# Patient Record
Sex: MALE | NOT HISPANIC OR LATINO | URBAN - METROPOLITAN AREA
[De-identification: names, ages, dates, MRNs, and addresses within clinical notes are randomized per-mention and may not be internally consistent; named-entity substitution may affect disease eponyms.]

---

## 2020-06-16 ENCOUNTER — OFFICE VISIT (OUTPATIENT)
Dept: FAMILY MEDICINE CLINIC | Facility: CLINIC | Age: 50
End: 2020-06-16

## 2020-06-16 ENCOUNTER — RESULTS ENCOUNTER (OUTPATIENT)
Dept: FAMILY MEDICINE CLINIC | Facility: CLINIC | Age: 50
End: 2020-06-16

## 2020-06-16 VITALS
TEMPERATURE: 97 F | BODY MASS INDEX: 35.68 KG/M2 | HEIGHT: 74 IN | WEIGHT: 278 LBS | RESPIRATION RATE: 16 BRPM | DIASTOLIC BLOOD PRESSURE: 82 MMHG | HEART RATE: 80 BPM | SYSTOLIC BLOOD PRESSURE: 123 MMHG

## 2020-06-16 DIAGNOSIS — Z12.11 SPECIAL SCREENING FOR MALIGNANT NEOPLASMS, COLON: ICD-10-CM

## 2020-06-16 DIAGNOSIS — M79.672 PAIN IN BOTH FEET: ICD-10-CM

## 2020-06-16 DIAGNOSIS — Z00.00 ANNUAL PHYSICAL EXAM: ICD-10-CM

## 2020-06-16 DIAGNOSIS — M79.671 PAIN IN BOTH FEET: ICD-10-CM

## 2020-06-16 DIAGNOSIS — L98.9 SKIN LESION: ICD-10-CM

## 2020-06-16 DIAGNOSIS — R20.2 PARESTHESIA: Primary | ICD-10-CM

## 2020-06-16 DIAGNOSIS — Z12.12 SCREENING FOR MALIGNANT NEOPLASM OF THE RECTUM: ICD-10-CM

## 2020-06-16 PROCEDURE — 99203 OFFICE O/P NEW LOW 30 MIN: CPT | Performed by: FAMILY MEDICINE

## 2020-06-16 NOTE — PROGRESS NOTES
"Subjective   Jefferson Martínez is a 49 y.o. male.     CC: Establishment of Care for Foot Issue    History of Present Illness     Pt presents today to establish care and to discuss some ongoing issues with feet pain. 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 No back pain or really any other issues.    FH: father with DM.  The following portions of the patient's history were reviewed and updated as appropriate: allergies, current medications, past family history, past medical history, past social history, past surgical history and problem list.    Review of Systems   Constitutional: Negative for activity change, chills, fatigue and fever.   Respiratory: Negative for cough and shortness of breath.    Cardiovascular: Negative for chest pain and palpitations.   Gastrointestinal: Negative for abdominal pain.   Endocrine: Negative for cold intolerance.   Musculoskeletal:        Foot pain   Skin:        Small nonhealing red lesion left thigh x several years   Neurological: Positive for numbness.   Psychiatric/Behavioral: Negative for behavioral problems and dysphoric mood. The patient is not nervous/anxious.        /82   Pulse 80   Temp 97 °F (36.1 °C) (Oral)   Resp 16   Ht 188 cm (74\")   Wt 126 kg (278 lb)   BMI 35.69 kg/m²     Objective   Physical Exam   Constitutional: He appears well-developed and well-nourished.   Neck: Neck supple. No thyromegaly present.   Cardiovascular: Normal rate and regular rhythm.   No murmur heard.  Pulmonary/Chest: Effort normal and breath sounds normal.   Abdominal: Bowel sounds are normal. There is no tenderness.      During the foot exam he had a monofilament test performed.    Neurological Sensory Findings -  Altered sharp/dull right ankle/foot discrimination and altered sharp/dull left ankle/foot discrimination. No right ankle/foot altered " proprioception and no left ankle/foot altered proprioception  Vascular Status -  His right foot exhibits normal foot vasculature  and no edema. His left foot exhibits normal foot vasculature  and no edema.  Skin Integrity  -  His right foot skin is intact.His left foot skin is intact..     Skin:        Psychiatric: He has a normal mood and affect. His behavior is normal.   Nursing note and vitals reviewed.      Assessment/Plan   Jefferson was seen today for est pcp, bilateral feet  pain and referral to dermatology.    Diagnoses and all orders for this visit:    Paresthesia  -     Comprehensive metabolic panel  -     TSH  -     Vitamin B12  -     Folate  -     Ambulatory Referral to Podiatry    Pain in both feet  -     Ambulatory Referral to Podiatry    Skin lesion  -     Ambulatory Referral to Dermatology    Annual physical exam  Comments:  for labs only, don't bill  Orders:  -     Comprehensive metabolic panel  -     Lipid panel  -     CBC and Differential  -     TSH  -     PSA    Special screening for malignant neoplasms, colon  -     Cologuard - Stool, Per Rectum; Future    Screening for malignant neoplasm of the rectum  -     Cologuard - Stool, Per Rectum; Future

## 2020-06-17 LAB
ALBUMIN SERPL-MCNC: 4.6 G/DL (ref 4–5)
ALBUMIN/GLOB SERPL: 1.7 {RATIO} (ref 1.2–2.2)
ALP SERPL-CCNC: 91 IU/L (ref 39–117)
ALT SERPL-CCNC: 20 IU/L (ref 0–44)
AST SERPL-CCNC: 18 IU/L (ref 0–40)
BASOPHILS # BLD AUTO: 0 X10E3/UL (ref 0–0.2)
BASOPHILS NFR BLD AUTO: 0 %
BILIRUB SERPL-MCNC: 1 MG/DL (ref 0–1.2)
BUN SERPL-MCNC: 15 MG/DL (ref 6–24)
BUN/CREAT SERPL: 13 (ref 9–20)
CALCIUM SERPL-MCNC: 9.5 MG/DL (ref 8.7–10.2)
CHLORIDE SERPL-SCNC: 100 MMOL/L (ref 96–106)
CHOLEST SERPL-MCNC: 187 MG/DL (ref 100–199)
CO2 SERPL-SCNC: 22 MMOL/L (ref 20–29)
CREAT SERPL-MCNC: 1.14 MG/DL (ref 0.76–1.27)
EOSINOPHIL # BLD AUTO: 0.1 X10E3/UL (ref 0–0.4)
EOSINOPHIL NFR BLD AUTO: 2 %
ERYTHROCYTE [DISTWIDTH] IN BLOOD BY AUTOMATED COUNT: 12.9 % (ref 11.6–15.4)
FOLATE SERPL-MCNC: >20 NG/ML
GLOBULIN SER CALC-MCNC: 2.7 G/DL (ref 1.5–4.5)
GLUCOSE SERPL-MCNC: 100 MG/DL (ref 65–99)
HCT VFR BLD AUTO: 47.2 % (ref 37.5–51)
HDLC SERPL-MCNC: 43 MG/DL
HGB BLD-MCNC: 15.8 G/DL (ref 13–17.7)
IMM GRANULOCYTES # BLD AUTO: 0 X10E3/UL (ref 0–0.1)
IMM GRANULOCYTES NFR BLD AUTO: 0 %
LDLC SERPL CALC-MCNC: 125 MG/DL (ref 0–99)
LYMPHOCYTES # BLD AUTO: 2.1 X10E3/UL (ref 0.7–3.1)
LYMPHOCYTES NFR BLD AUTO: 22 %
MCH RBC QN AUTO: 29 PG (ref 26.6–33)
MCHC RBC AUTO-ENTMCNC: 33.5 G/DL (ref 31.5–35.7)
MCV RBC AUTO: 87 FL (ref 79–97)
MONOCYTES # BLD AUTO: 0.8 X10E3/UL (ref 0.1–0.9)
MONOCYTES NFR BLD AUTO: 8 %
NEUTROPHILS # BLD AUTO: 6.4 X10E3/UL (ref 1.4–7)
NEUTROPHILS NFR BLD AUTO: 68 %
PLATELET # BLD AUTO: 217 X10E3/UL (ref 150–450)
POTASSIUM SERPL-SCNC: 5.1 MMOL/L (ref 3.5–5.2)
PROT SERPL-MCNC: 7.3 G/DL (ref 6–8.5)
PSA SERPL-MCNC: 3 NG/ML (ref 0–4)
RBC # BLD AUTO: 5.45 X10E6/UL (ref 4.14–5.8)
SODIUM SERPL-SCNC: 140 MMOL/L (ref 134–144)
TRIGL SERPL-MCNC: 96 MG/DL (ref 0–149)
TSH SERPL DL<=0.005 MIU/L-ACNC: 1.83 UIU/ML (ref 0.45–4.5)
VIT B12 SERPL-MCNC: 671 PG/ML (ref 232–1245)
VLDLC SERPL CALC-MCNC: 19 MG/DL (ref 5–40)
WBC # BLD AUTO: 9.5 X10E3/UL (ref 3.4–10.8)

## 2021-04-12 VITALS — BODY MASS INDEX: 35.69 KG/M2 | WEIGHT: 278 LBS

## 2021-04-13 ENCOUNTER — TELEMEDICINE (OUTPATIENT)
Dept: FAMILY MEDICINE CLINIC | Facility: CLINIC | Age: 51
End: 2021-04-13

## 2021-04-13 DIAGNOSIS — U07.1 UPPER RESPIRATORY TRACT INFECTION DUE TO COVID-19 VIRUS: Primary | ICD-10-CM

## 2021-04-13 DIAGNOSIS — J06.9 UPPER RESPIRATORY TRACT INFECTION DUE TO COVID-19 VIRUS: Primary | ICD-10-CM

## 2021-04-13 PROCEDURE — 99213 OFFICE O/P EST LOW 20 MIN: CPT | Performed by: FAMILY MEDICINE

## 2021-04-13 RX ORDER — ASCORBIC ACID 250 MG
250 TABLET ORAL DAILY
COMMUNITY
End: 2021-12-06

## 2021-04-13 RX ORDER — MELOXICAM 15 MG/1
TABLET ORAL
COMMUNITY
Start: 2021-01-29 | End: 2021-12-06

## 2021-04-13 NOTE — PROGRESS NOTES
Subjective   Jefferson Martínez is a 50 y.o. male.     CC: Video Visit for COVID    History of Present Illness     Pt seen on a VV today after being dx with COVID on 4/5. Pt sent this note prior to today's visit:  My wife and I tested positive for Covid last Monday (5 April). We dealt with some moderate symptoms but have been improving steadily the last few days. 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   I'm still dealing with some congestion, post nasal drip and coughing. I think it would be best if I stayed home for a few more days rather than spending my nights coughing in confined spaces.   Work won't have a problem with it but do require that I get your involvement. I don't know if we can do a quick telephone consult or if you can just respond to this message based on my symptoms. I think moving my return to work date to the end of the week (16 April) would be appropriate.   Hopefully this makes sense. Hope all is well, I'll get scheduled for a follow up soon.      The following portions of the patient's history were reviewed and updated as appropriate: allergies, current medications, past family history, past medical history, past social history, past surgical history and problem list.    Review of Systems   Constitutional: Negative for activity change, chills and fever.   HENT: Positive for congestion.    Respiratory: Positive for cough.    Cardiovascular: Negative for chest pain.   Psychiatric/Behavioral: Negative for dysphoric mood.       Objective   Physical Exam  Constitutional:       General: He is not in acute distress.     Appearance: He is well-developed.   Pulmonary:      Effort: Pulmonary effort is normal.   Neurological:      Mental Status: He is alert and oriented to person, place, and time.   Psychiatric:         Behavior: Behavior normal.         Thought Content: Thought content normal.         Assessment/Plan   Diagnoses and all orders for this  visit:    1. Upper respiratory tract infection due to COVID-19 virus (Primary)    Will keep pt home until next Tuesday. Pt improving nicely and will rest/hydrate/discussed ASA for 7-14 days and Vitamin D/C.  Spent  13   minutes with chart and interview and consent for this encounter given by the patient.  You have chosen to receive care through a telehealth visit.  Do you consent to use a video/audio connection for your medical care today? Yes

## 2021-05-17 NOTE — PROGRESS NOTES
Subjective   Jefferson Martínez is a 50 y.o. male.     CC: Video Visit for Management of COVID    History of Present Illness     Pt seen today on a VV after being dx with COVID on 4/5/21. Pt was seen on VV 4/13/21 by me and was improving at the time and was asked to rest, use ASA and vitamin D+C, and f/u if issues. Pt got over that well w/o issues.  Now, pt is 2 days into mild facial p/p with ear pressure and yellow nasal d/c. No f/c/cough/dyspnea/change to smell/taste.    The following portions of the patient's history were reviewed and updated as appropriate: allergies, current medications, past family history, past medical history, past social history, past surgical history and problem list.    Review of Systems   Constitutional: Negative for activity change, chills and fever.   HENT: Positive for congestion, ear pain and sinus pressure.    Respiratory: Negative for cough.    Cardiovascular: Negative for chest pain.   Psychiatric/Behavioral: Negative for dysphoric mood.       Objective   Physical Exam  Constitutional:       General: He is not in acute distress.     Appearance: He is well-developed.   HENT:      Nose:      Right Sinus: Maxillary sinus tenderness (pt points here) present.      Left Sinus: Maxillary sinus tenderness (pt points here) present.   Pulmonary:      Effort: Pulmonary effort is normal.   Neurological:      Mental Status: He is alert and oriented to person, place, and time.   Psychiatric:         Behavior: Behavior normal.         Thought Content: Thought content normal.         Assessment/Plan   Diagnoses and all orders for this visit:    1. Acute URI (Primary)  -     doxycycline (VIBRAMYICN) 100 MG tablet; Take 1 tablet by mouth 2 (Two) Times a Day.  Dispense: 20 tablet; Refill: 0      Spent 13    minutes with chart and interview and consent for this encounter given by the patient.  You have chosen to receive care through a telehealth visit.  Do you consent to use a video/audio connection for  your medical care today? Yes

## 2021-05-18 ENCOUNTER — TELEMEDICINE (OUTPATIENT)
Dept: FAMILY MEDICINE CLINIC | Facility: CLINIC | Age: 51
End: 2021-05-18

## 2021-05-18 VITALS — BODY MASS INDEX: 35.68 KG/M2 | WEIGHT: 278 LBS | HEIGHT: 74 IN

## 2021-05-18 DIAGNOSIS — J06.9 ACUTE URI: Primary | ICD-10-CM

## 2021-05-18 PROCEDURE — 99213 OFFICE O/P EST LOW 20 MIN: CPT | Performed by: FAMILY MEDICINE

## 2021-05-18 RX ORDER — DOXYCYCLINE HYCLATE 100 MG
100 TABLET ORAL 2 TIMES DAILY
Qty: 20 TABLET | Refills: 0 | Status: SHIPPED | OUTPATIENT
Start: 2021-05-18 | End: 2021-12-04

## 2021-08-30 DIAGNOSIS — Z00.00 ANNUAL PHYSICAL EXAM: Primary | ICD-10-CM

## 2021-08-31 ENCOUNTER — PATIENT MESSAGE (OUTPATIENT)
Dept: FAMILY MEDICINE CLINIC | Facility: CLINIC | Age: 51
End: 2021-08-31

## 2021-09-03 LAB
ALBUMIN SERPL-MCNC: 4.5 G/DL (ref 3.5–5.2)
ALBUMIN/GLOB SERPL: 2.3 G/DL
ALP SERPL-CCNC: 71 U/L (ref 39–117)
ALT SERPL-CCNC: 23 U/L (ref 1–41)
AST SERPL-CCNC: 23 U/L (ref 1–40)
BASOPHILS # BLD AUTO: 0.04 10*3/MM3 (ref 0–0.2)
BASOPHILS NFR BLD AUTO: 0.6 % (ref 0–1.5)
BILIRUB SERPL-MCNC: 0.9 MG/DL (ref 0–1.2)
BUN SERPL-MCNC: 14 MG/DL (ref 6–20)
BUN/CREAT SERPL: 13.6 (ref 7–25)
CALCIUM SERPL-MCNC: 9.9 MG/DL (ref 8.6–10.5)
CHLORIDE SERPL-SCNC: 106 MMOL/L (ref 98–107)
CHOLEST SERPL-MCNC: 148 MG/DL (ref 0–200)
CO2 SERPL-SCNC: 26.4 MMOL/L (ref 22–29)
CREAT SERPL-MCNC: 1.03 MG/DL (ref 0.76–1.27)
EOSINOPHIL # BLD AUTO: 0.13 10*3/MM3 (ref 0–0.4)
EOSINOPHIL NFR BLD AUTO: 1.8 % (ref 0.3–6.2)
ERYTHROCYTE [DISTWIDTH] IN BLOOD BY AUTOMATED COUNT: 12.6 % (ref 12.3–15.4)
GLOBULIN SER CALC-MCNC: 2 GM/DL
GLUCOSE SERPL-MCNC: 88 MG/DL (ref 65–99)
HCT VFR BLD AUTO: 47.3 % (ref 37.5–51)
HDLC SERPL-MCNC: 46 MG/DL (ref 40–60)
HGB BLD-MCNC: 14.9 G/DL (ref 13–17.7)
IMM GRANULOCYTES # BLD AUTO: 0.02 10*3/MM3 (ref 0–0.05)
IMM GRANULOCYTES NFR BLD AUTO: 0.3 % (ref 0–0.5)
LDLC SERPL CALC-MCNC: 87 MG/DL (ref 0–100)
LYMPHOCYTES # BLD AUTO: 1.51 10*3/MM3 (ref 0.7–3.1)
LYMPHOCYTES NFR BLD AUTO: 21.4 % (ref 19.6–45.3)
MCH RBC QN AUTO: 29.3 PG (ref 26.6–33)
MCHC RBC AUTO-ENTMCNC: 31.5 G/DL (ref 31.5–35.7)
MCV RBC AUTO: 93.1 FL (ref 79–97)
MONOCYTES # BLD AUTO: 0.57 10*3/MM3 (ref 0.1–0.9)
MONOCYTES NFR BLD AUTO: 8.1 % (ref 5–12)
NEUTROPHILS # BLD AUTO: 4.79 10*3/MM3 (ref 1.7–7)
NEUTROPHILS NFR BLD AUTO: 67.8 % (ref 42.7–76)
NRBC BLD AUTO-RTO: 0 /100 WBC (ref 0–0.2)
PLATELET # BLD AUTO: 188 10*3/MM3 (ref 140–450)
POTASSIUM SERPL-SCNC: 4.8 MMOL/L (ref 3.5–5.2)
PROT SERPL-MCNC: 6.5 G/DL (ref 6–8.5)
PSA SERPL-MCNC: 1.81 NG/ML (ref 0–4)
RBC # BLD AUTO: 5.08 10*6/MM3 (ref 4.14–5.8)
SODIUM SERPL-SCNC: 140 MMOL/L (ref 136–145)
TRIGL SERPL-MCNC: 77 MG/DL (ref 0–150)
TSH SERPL DL<=0.005 MIU/L-ACNC: 1.59 UIU/ML (ref 0.27–4.2)
VLDLC SERPL CALC-MCNC: 15 MG/DL (ref 5–40)
WBC # BLD AUTO: 7.06 10*3/MM3 (ref 3.4–10.8)

## 2021-12-04 NOTE — PROGRESS NOTES
"Subjective   Jefferson Martínez is a 50 y.o. male.     CC: Tingling in Feet    History of Present Illness     Pt, who works as a  at [employer], comes in today c/o some \"tingling\" in the feet for the past few years now. Saw podiatry and got inserts, took vitamins, and had a Nerve Conduction study showing some \"slowing\" of a particular nerve (Tarsal Tunnel)  Symptoms always there, but much worse when supine in bed. Pt reports lack of sensation of the bottoms of the feet (has stepped on a nail prior and didn't feel it). Pt also reports \"hypersensitivity\" with this. Has had some prior lower back issues when in the Service in the past. No current back pain, unless standing for length of time.      The following portions of the patient's history were reviewed and updated as appropriate: allergies, current medications, past family history, past medical history, past social history, past surgical history and problem list.    Review of Systems   Constitutional: Negative for activity change, chills and fever.   Respiratory: Negative for cough.    Cardiovascular: Negative for chest pain.   Neurological: Positive for numbness (tingling).   Psychiatric/Behavioral: Negative for dysphoric mood.       /77   Pulse 58   Temp 97 °F (36.1 °C) (Oral)   Resp 14   Ht 188 cm (74\")   Wt 106 kg (234 lb)   BMI 30.04 kg/m²     Objective   Physical Exam  Constitutional:       General: He is not in acute distress.     Appearance: He is well-developed.   Cardiovascular:      Rate and Rhythm: Normal rate.   Pulmonary:      Effort: Pulmonary effort is normal.      Breath sounds: Normal breath sounds.   Neurological:      Mental Status: He is alert and oriented to person, place, and time.   Psychiatric:         Behavior: Behavior normal.         Thought Content: Thought content normal.     Nerve Conduction study reviewed by me at today's visit.  L-spine: ordered due to chronic neuropathy with prior LBP, no comparison, read by me: " WNL    Assessment/Plan   Diagnoses and all orders for this visit:    1. Neuropathy (Primary)  -     XR Spine Lumbar 2 or 3 View  -     MRI Lumbar Spine Without Contrast; Future    2. Low back pain without sciatica, unspecified back pain laterality, unspecified chronicity  -     XR Spine Lumbar 2 or 3 View  -     MRI Lumbar Spine Without Contrast; Future    If full lumbar w/u is negative, pt to go back to the Podiatrist and discuss possible surgical options to free up the Tarsal Tunnel.

## 2021-12-06 ENCOUNTER — OFFICE VISIT (OUTPATIENT)
Dept: FAMILY MEDICINE CLINIC | Facility: CLINIC | Age: 51
End: 2021-12-06

## 2021-12-06 VITALS
DIASTOLIC BLOOD PRESSURE: 77 MMHG | HEIGHT: 74 IN | WEIGHT: 234 LBS | BODY MASS INDEX: 30.03 KG/M2 | RESPIRATION RATE: 14 BRPM | TEMPERATURE: 97 F | SYSTOLIC BLOOD PRESSURE: 126 MMHG | HEART RATE: 58 BPM

## 2021-12-06 DIAGNOSIS — M54.50 LOW BACK PAIN WITHOUT SCIATICA, UNSPECIFIED BACK PAIN LATERALITY, UNSPECIFIED CHRONICITY: ICD-10-CM

## 2021-12-06 DIAGNOSIS — G62.9 NEUROPATHY: Primary | ICD-10-CM

## 2021-12-06 PROCEDURE — 99213 OFFICE O/P EST LOW 20 MIN: CPT | Performed by: FAMILY MEDICINE

## 2021-12-06 PROCEDURE — 72100 X-RAY EXAM L-S SPINE 2/3 VWS: CPT | Performed by: FAMILY MEDICINE

## 2022-01-04 DIAGNOSIS — M51.36 DDD (DEGENERATIVE DISC DISEASE), LUMBAR: Primary | ICD-10-CM

## 2022-03-28 ENCOUNTER — TELEMEDICINE (OUTPATIENT)
Dept: FAMILY MEDICINE CLINIC | Facility: CLINIC | Age: 52
End: 2022-03-28

## 2022-03-28 VITALS — WEIGHT: 235 LBS | BODY MASS INDEX: 30.16 KG/M2 | HEIGHT: 74 IN

## 2022-03-28 DIAGNOSIS — J06.9 ACUTE URI: Primary | ICD-10-CM

## 2022-03-28 PROCEDURE — 99213 OFFICE O/P EST LOW 20 MIN: CPT | Performed by: FAMILY MEDICINE

## 2022-03-28 RX ORDER — DOXYCYCLINE HYCLATE 100 MG
100 TABLET ORAL 2 TIMES DAILY
Qty: 20 TABLET | Refills: 0 | Status: SHIPPED | OUTPATIENT
Start: 2022-03-28

## 2022-03-28 NOTE — PROGRESS NOTES
Subjective   Jefferson Martínez is a 51 y.o. male.     CC: VV for URI-Sx    History of Present Illness     Pt seen today on a VV after sending this message this AM:    Hope all is well! Symptoms started Friday. Took an at-home COVID test, it was negative. Same symptoms as last year, clear runny discharge, congestion, trouble clearing ears.      Seems to be staying in my head for now. Antibiotics usually knock this out pretty quick. Hoping to get a prescription so I can get back to work.    Pt reports since last Friday with facial p/p, PND and RN, and now a slight cough. No dyspnea/f/c.      The following portions of the patient's history were reviewed and updated as appropriate: allergies, current medications, past family history, past medical history, past social history, past surgical history and problem list.    Review of Systems   Constitutional: Negative for activity change, chills and fever.   HENT: Positive for congestion, postnasal drip, rhinorrhea and sinus pressure.    Respiratory: Positive for cough (mild).    Cardiovascular: Negative for chest pain.   Psychiatric/Behavioral: Negative for dysphoric mood.       Objective   Physical Exam  Constitutional:       General: He is not in acute distress.     Appearance: He is well-developed.   Pulmonary:      Effort: Pulmonary effort is normal.   Neurological:      Mental Status: He is alert and oriented to person, place, and time.   Psychiatric:         Behavior: Behavior normal.         Thought Content: Thought content normal.         Assessment/Plan   Diagnoses and all orders for this visit:    1. Acute URI (Primary)  -     doxycycline (VIBRAMYICN) 100 MG tablet; Take 1 tablet by mouth 2 (Two) Times a Day.  Dispense: 20 tablet; Refill: 0    Spent  12   minutes with chart and interview and consent for this encounter given by the patient.  You have chosen to receive care through a telehealth visit.  Do you consent to use a video/audio connection for your medical care  today? Yes

## 2023-06-10 NOTE — PROGRESS NOTES
"Subjective   Jefferson Martínez is a 52 y.o. male.     CC: Elevated BPs/Neuropathy/Hip Pain    History of Present Illness     Pt returns today in person for the first time since 12/21 when he saw me for this:    Pt, who works as a  at [employer], comes in today c/o some \"tingling\" in the feet for the past few years now. Saw podiatry and got inserts, took vitamins, and had a Nerve Conduction study showing some \"slowing\" of a particular nerve (Tarsal Tunnel)  Symptoms always there, but much worse when supine in bed. Pt reports lack of sensation of the bottoms of the feet (has stepped on a nail prior and didn't feel it). Pt also reports \"hypersensitivity\" with this. Has had some prior lower back issues when in the Service in the past. No current back pain, unless standing for length of time.    Pt also stopped golfing 3 years ago due to left hip pain. Pt reports pain with walking and subjective instability of the hip.     Lastly, pt gets \"white-coat\" issues when seeing his [employer] MD q6 months.       The following portions of the patient's history were reviewed and updated as appropriate: allergies, current medications, past family history, past medical history, past social history, past surgical history and problem list.    Review of Systems   Constitutional:  Negative for activity change, chills and fever.   Respiratory:  Negative for cough and shortness of breath.    Cardiovascular:  Negative for chest pain and palpitations.   Musculoskeletal:  Negative for neck pain.   Neurological:  Negative for headaches.   Psychiatric/Behavioral:  Negative for dysphoric mood.      /86   Pulse 84   Temp 97.8 °F (36.6 °C) (Oral)   Resp 16   Ht 188 cm (74\")   Wt 133 kg (294 lb)   SpO2 97%   BMI 37.75 kg/m²     Objective   Physical Exam  Constitutional:       General: He is not in acute distress.     Appearance: He is well-developed.   Cardiovascular:      Rate and Rhythm: Normal rate and regular rhythm.   Pulmonary:      Effort: " Pulmonary effort is normal.      Breath sounds: Normal breath sounds.   Neurological:      Mental Status: He is alert and oriented to person, place, and time.   Psychiatric:         Behavior: Behavior normal.         Thought Content: Thought content normal.       Assessment & Plan   Diagnoses and all orders for this visit:    1. Left hip pain (Primary)  Comments:  possible labral issue  Orders:  -     Ambulatory Referral to Orthopedic Surgery    2. Neuropathy  -     Ambulatory Referral to Neurology    3. Annual physical exam  Comments:  labs only, don't bill  Orders:  -     Comprehensive metabolic panel  -     Lipid panel  -     CBC and Differential  -     TSH  -     PSA

## 2023-06-12 ENCOUNTER — OFFICE VISIT (OUTPATIENT)
Dept: FAMILY MEDICINE CLINIC | Facility: CLINIC | Age: 53
End: 2023-06-12
Payer: COMMERCIAL

## 2023-06-12 VITALS
DIASTOLIC BLOOD PRESSURE: 86 MMHG | SYSTOLIC BLOOD PRESSURE: 127 MMHG | BODY MASS INDEX: 37.73 KG/M2 | RESPIRATION RATE: 16 BRPM | HEIGHT: 74 IN | WEIGHT: 294 LBS | HEART RATE: 84 BPM | TEMPERATURE: 97.8 F | OXYGEN SATURATION: 97 %

## 2023-06-12 DIAGNOSIS — Z00.00 ANNUAL PHYSICAL EXAM: ICD-10-CM

## 2023-06-12 DIAGNOSIS — G62.9 NEUROPATHY: ICD-10-CM

## 2023-06-12 DIAGNOSIS — M25.552 LEFT HIP PAIN: Primary | ICD-10-CM

## 2023-06-12 LAB
ALBUMIN SERPL-MCNC: 4.5 G/DL (ref 3.5–5.2)
ALBUMIN/GLOB SERPL: 2 G/DL
ALP SERPL-CCNC: 82 U/L (ref 39–117)
ALT SERPL-CCNC: 29 U/L (ref 1–41)
AST SERPL-CCNC: 21 U/L (ref 1–40)
BASOPHILS # BLD AUTO: 0.05 10*3/MM3 (ref 0–0.2)
BASOPHILS NFR BLD AUTO: 0.6 % (ref 0–1.5)
BILIRUB SERPL-MCNC: 0.9 MG/DL (ref 0–1.2)
BUN SERPL-MCNC: 13 MG/DL (ref 6–20)
BUN/CREAT SERPL: 11 (ref 7–25)
CALCIUM SERPL-MCNC: 10.1 MG/DL (ref 8.6–10.5)
CHLORIDE SERPL-SCNC: 106 MMOL/L (ref 98–107)
CHOLEST SERPL-MCNC: 201 MG/DL (ref 0–200)
CO2 SERPL-SCNC: 27.5 MMOL/L (ref 22–29)
CREAT SERPL-MCNC: 1.18 MG/DL (ref 0.76–1.27)
EGFRCR SERPLBLD CKD-EPI 2021: 73.8 ML/MIN/1.73
EOSINOPHIL # BLD AUTO: 0.31 10*3/MM3 (ref 0–0.4)
EOSINOPHIL NFR BLD AUTO: 3.7 % (ref 0.3–6.2)
ERYTHROCYTE [DISTWIDTH] IN BLOOD BY AUTOMATED COUNT: 12.8 % (ref 12.3–15.4)
GLOBULIN SER CALC-MCNC: 2.3 GM/DL
GLUCOSE SERPL-MCNC: 114 MG/DL (ref 65–99)
HCT VFR BLD AUTO: 46.9 % (ref 37.5–51)
HDLC SERPL-MCNC: 43 MG/DL (ref 40–60)
HGB BLD-MCNC: 16.1 G/DL (ref 13–17.7)
IMM GRANULOCYTES # BLD AUTO: 0.05 10*3/MM3 (ref 0–0.05)
IMM GRANULOCYTES NFR BLD AUTO: 0.6 % (ref 0–0.5)
LDLC SERPL CALC-MCNC: 135 MG/DL (ref 0–100)
LYMPHOCYTES # BLD AUTO: 1.94 10*3/MM3 (ref 0.7–3.1)
LYMPHOCYTES NFR BLD AUTO: 23.1 % (ref 19.6–45.3)
MCH RBC QN AUTO: 30.2 PG (ref 26.6–33)
MCHC RBC AUTO-ENTMCNC: 34.3 G/DL (ref 31.5–35.7)
MCV RBC AUTO: 88 FL (ref 79–97)
MONOCYTES # BLD AUTO: 0.71 10*3/MM3 (ref 0.1–0.9)
MONOCYTES NFR BLD AUTO: 8.4 % (ref 5–12)
NEUTROPHILS # BLD AUTO: 5.35 10*3/MM3 (ref 1.7–7)
NEUTROPHILS NFR BLD AUTO: 63.6 % (ref 42.7–76)
NRBC BLD AUTO-RTO: 0 /100 WBC (ref 0–0.2)
PLATELET # BLD AUTO: 190 10*3/MM3 (ref 140–450)
POTASSIUM SERPL-SCNC: 5.2 MMOL/L (ref 3.5–5.2)
PROT SERPL-MCNC: 6.8 G/DL (ref 6–8.5)
PSA SERPL-MCNC: 1.92 NG/ML (ref 0–4)
RBC # BLD AUTO: 5.33 10*6/MM3 (ref 4.14–5.8)
SODIUM SERPL-SCNC: 144 MMOL/L (ref 136–145)
TRIGL SERPL-MCNC: 130 MG/DL (ref 0–150)
TSH SERPL DL<=0.005 MIU/L-ACNC: 2.28 UIU/ML (ref 0.27–4.2)
VLDLC SERPL CALC-MCNC: 23 MG/DL (ref 5–40)
WBC # BLD AUTO: 8.41 10*3/MM3 (ref 3.4–10.8)

## 2023-08-11 ENCOUNTER — TELEPHONE (OUTPATIENT)
Dept: NEUROLOGY | Facility: CLINIC | Age: 53
End: 2023-08-11
Payer: COMMERCIAL

## 2023-08-16 ENCOUNTER — HOSPITAL ENCOUNTER (OUTPATIENT)
Dept: GENERAL RADIOLOGY | Facility: HOSPITAL | Age: 53
Discharge: HOME | End: 2023-08-16
Payer: COMMERCIAL

## 2023-08-16 PROCEDURE — 25510000001 IOPAMIDOL 61 % SOLUTION: Performed by: NURSE PRACTITIONER

## 2023-08-16 PROCEDURE — 77002 NEEDLE LOCALIZATION BY XRAY: CPT

## 2023-08-16 PROCEDURE — 25010000002 METHYLPREDNISOLONE PER 125 MG: Performed by: NURSE PRACTITIONER

## 2023-08-16 PROCEDURE — 25010000002 BUPIVACAINE (PF) 0.25 % SOLUTION: Performed by: NURSE PRACTITIONER

## 2023-08-16 PROCEDURE — 0 LIDOCAINE 1 % SOLUTION: Performed by: NURSE PRACTITIONER

## 2023-08-16 RX ORDER — LIDOCAINE HYDROCHLORIDE 10 MG/ML
20 INJECTION, SOLUTION INFILTRATION; PERINEURAL ONCE
Status: COMPLETED | OUTPATIENT
Start: 2023-08-16 | End: 2023-08-16

## 2023-08-16 RX ORDER — METHYLPREDNISOLONE SODIUM SUCCINATE 125 MG/2ML
80 INJECTION, POWDER, LYOPHILIZED, FOR SOLUTION INTRAMUSCULAR; INTRAVENOUS
Status: COMPLETED | OUTPATIENT
Start: 2023-08-16 | End: 2023-08-16

## 2023-08-16 RX ORDER — BUPIVACAINE HYDROCHLORIDE 2.5 MG/ML
10 INJECTION, SOLUTION EPIDURAL; INFILTRATION; INTRACAUDAL ONCE
Status: COMPLETED | OUTPATIENT
Start: 2023-08-16 | End: 2023-08-16

## 2023-08-16 RX ADMIN — BUPIVACAINE HYDROCHLORIDE 5 ML: 2.5 INJECTION, SOLUTION EPIDURAL; INFILTRATION; INTRACAUDAL; PERINEURAL at 11:45

## 2023-08-16 RX ADMIN — IOPAMIDOL 1 ML: 612 INJECTION, SOLUTION INTRAVENOUS at 11:45

## 2023-08-16 RX ADMIN — METHYLPREDNISOLONE SODIUM SUCCINATE 80 MG: 125 INJECTION, POWDER, LYOPHILIZED, FOR SOLUTION INTRAMUSCULAR; INTRAVENOUS at 11:45

## 2023-08-16 RX ADMIN — LIDOCAINE HYDROCHLORIDE 3 ML: 10 INJECTION, SOLUTION INFILTRATION; PERINEURAL at 11:45

## 2023-09-27 DIAGNOSIS — M25.552 LEFT HIP PAIN: ICD-10-CM

## 2023-09-27 DIAGNOSIS — M51.36 DDD (DEGENERATIVE DISC DISEASE), LUMBAR: ICD-10-CM

## 2023-09-27 DIAGNOSIS — G62.9 NEUROPATHY: Primary | ICD-10-CM

## 2023-09-28 DIAGNOSIS — G62.9 NEUROPATHY: Primary | ICD-10-CM

## 2023-09-28 DIAGNOSIS — M25.552 LEFT HIP PAIN: ICD-10-CM

## 2023-09-28 DIAGNOSIS — M51.36 DDD (DEGENERATIVE DISC DISEASE), LUMBAR: ICD-10-CM

## 2023-10-06 ENCOUNTER — TELEPHONE (OUTPATIENT)
Dept: NEUROLOGY | Facility: CLINIC | Age: 53
End: 2023-10-06
Payer: COMMERCIAL

## 2023-10-27 ENCOUNTER — OFFICE VISIT (OUTPATIENT)
Dept: NEUROLOGY | Facility: CLINIC | Age: 53
End: 2023-10-27
Payer: COMMERCIAL

## 2023-10-27 VITALS
HEART RATE: 72 BPM | HEIGHT: 74 IN | WEIGHT: 286 LBS | DIASTOLIC BLOOD PRESSURE: 76 MMHG | BODY MASS INDEX: 36.7 KG/M2 | SYSTOLIC BLOOD PRESSURE: 132 MMHG | OXYGEN SATURATION: 98 %

## 2023-10-27 DIAGNOSIS — R20.2 NUMBNESS AND TINGLING OF BOTH FEET: Primary | ICD-10-CM

## 2023-10-27 DIAGNOSIS — R20.0 NUMBNESS AND TINGLING OF BOTH FEET: Primary | ICD-10-CM

## 2023-10-27 NOTE — PROGRESS NOTES
"Chief Complaint   Patient presents with    Peripheral Neuropathy       Patient ID: Jefferson Martínez is a 52 y.o. male.    HPI:  I had the pleasure of seeing your patient today.  As you may know he is a 52-year-old gentleman being seen at the request of and referred to us by Dr. Forman for history of numbness and tingling in both feet.  The patient says that he started having these symptoms in the toes and balls of both feet about 5 years ago.  It was symmetric and did not cause significant distress for him.  However the symptoms have progressed since then.  He does occasionally get some lower back pain however not on a fairly regular basis at this point in his life.  He did see a podiatrist for his issues and had an EMG/nerve conduction study about 4 years ago.  He says that the results of that test indicated \"tarsal tunnel syndrome\".  This apparently was noted bilaterally.  He did also have an MRI of the lumbosacral spine showing degenerative disc disease, mild for the most part however there was evidence for a disc protrusion at L5-S1 contacting the left L5 nerve root.  He essentially is here for second opinion on that to see if things have changed since then.  He apparently was slated for tarsal tunnel surgery bilaterally.  He has no bowel or bladder symptoms.  No chronic neck pain issues.  No symptoms in his hands.  In looking in his chart he did have a vitamin B12 level back 5 years ago of 462.  More recently 3 years ago it was 671.    The following portions of the patient's history were reviewed and updated as appropriate: allergies, current medications, past family history, past medical history, past social history, past surgical history and problem list.    Review of Systems   Musculoskeletal:  Negative for gait problem.   Neurological:  Positive for numbness (both feet). Negative for dizziness, tremors, seizures, syncope, facial asymmetry, speech difficulty, weakness, light-headedness and headaches. "   Psychiatric/Behavioral:  Positive for sleep disturbance (due to feet).       I have reviewed the review of systems above performed by my medical assistant.      Vitals:    10/27/23 1102   BP: 132/76   Pulse: 72   SpO2: 98%       Neurologic Exam     Mental Status   Oriented to person, place, and time.   Registration: recalls 3 of 3 objects. Follows 3 step commands.   Attention: normal. Concentration: normal.   Speech: speech is normal   Level of consciousness: alert  Knowledge: consistent with education (No deficits found.).   Normal comprehension.     Cranial Nerves     CN II   Visual fields full to confrontation.     CN III, IV, VI   Pupils are equal, round, and reactive to light.  Extraocular motions are normal.   CN III: no CN III palsy  CN VI: no CN VI palsy  Nystagmus: none   Diplopia: none    CN V   Facial sensation intact.     CN VII   Facial expression full, symmetric.     CN VIII   CN VIII normal.     CN IX, X   CN IX normal.   CN X normal.     CN XI   CN XI normal.     CN XII   CN XII normal.     Motor Exam   Muscle bulk: normal  Right arm tone: normal  Left arm tone: normal  Right leg tone: normal  Left leg tone: normal    Strength   Right neck flexion: 5/5  Left neck flexion: 5/5  Right neck extension: 5/5  Left neck extension: 5/5  Right deltoid: 5/5  Left deltoid: 5/5  Right biceps: 5/5  Left biceps: 5/5  Right triceps: 5/5  Left triceps: 5/5  Right wrist flexion: 5/5  Left wrist flexion: 5/5  Right wrist extension: 5/5  Left wrist extension: 5/5  Right interossei: 5/5  Left interossei: 5/5  Right abdominals: 5/5  Left abdominals: 5/5  Right iliopsoas: 5/5  Left iliopsoas: 5/5  Right quadriceps: 5/5  Left quadriceps: 5/5  Right hamstrin/5  Left hamstrin/5  Right glutei: 5/5  Left glutei: 5/5  Right anterior tibial: 5/5  Left anterior tibial: 5/5  Right posterior tibial: 5/5  Left posterior tibial: 5/5  Right peroneal: 5/5  Left peroneal: 5/5  Right gastroc: 5/5  Left gastroc: 5/5    Sensory  Exam   Light touch normal.   Right leg vibration: decreased from ankle  Proprioception normal.   Pinprick normal.     Gait, Coordination, and Reflexes     Gait  Gait: normal    Coordination   Romberg: negative    Tremor   Resting tremor: absent  Intention tremor: absent    Reflexes   Right brachioradialis: 2+  Left brachioradialis: 2+  Right biceps: 2+  Left biceps: 2+  Right triceps: 2+  Left triceps: 2+  Right patellar: 0  Left patellar: 0  Right achilles: 0  Left achilles: 0  Right : 2+  Left : 2+Station is normal.       Physical Exam  Vitals reviewed.   Constitutional:       General: He is not in acute distress.     Appearance: He is well-developed.   HENT:      Head: Normocephalic and atraumatic.   Eyes:      Extraocular Movements: EOM normal.      Pupils: Pupils are equal, round, and reactive to light.   Cardiovascular:      Rate and Rhythm: Normal rate and regular rhythm.      Heart sounds: Normal heart sounds.   Pulmonary:      Effort: Pulmonary effort is normal. No respiratory distress.      Breath sounds: Normal breath sounds.   Abdominal:      General: Bowel sounds are normal. There is no distension.      Palpations: Abdomen is soft.      Tenderness: There is no abdominal tenderness.   Musculoskeletal:         General: No deformity. Normal range of motion.      Cervical back: Normal range of motion.   Skin:     General: Skin is warm.      Findings: No rash.   Neurological:      Mental Status: He is oriented to person, place, and time.      Coordination: Romberg Test normal.      Gait: Gait is intact.      Deep Tendon Reflexes:      Reflex Scores:       Tricep reflexes are 2+ on the right side and 2+ on the left side.       Bicep reflexes are 2+ on the right side and 2+ on the left side.       Brachioradialis reflexes are 2+ on the right side and 2+ on the left side.       Patellar reflexes are 0 on the right side and 0 on the left side.       Achilles reflexes are 0 on the right side and 0 on the  left side.  Psychiatric:         Speech: Speech normal.         Judgment: Judgment normal.         Procedures    Assessment/Plan: It sounds as though he likely has some type of radiculopathy based on MRI imaging from a couple of years ago and my exam.  However we will schedule an EMG/nerve conduction study of both lower extremities to further corroborate that.  I do also question whether he has a more diffuse peripheral neuropathy perhaps secondary to vitamin B12 deficiency whether it be large or small fiber.  May consider further lab testing in the future.  However we will start with EMG and go from there.  He will call for results of testing.       Diagnoses and all orders for this visit:    1. Numbness and tingling of both feet (Primary)  -     EMG & Nerve Conduction Test; Future           Jabier Hooker II, MD

## 2023-10-27 NOTE — LETTER
"October 27, 2023       No Recipients    Patient: Jefferson Martínez   YOB: 1970   Date of Visit: 10/27/2023     Dear Jefferson Forman MD:       Thank you for referring Jefferson Martínez to me for evaluation. Below are the relevant portions of my assessment and plan of care.    If you have questions, please do not hesitate to call me. I look forward to following Jefferson along with you.         Sincerely,        Jabier Hooker II, MD        CC:   No Recipients    Jabier Hooker II, MD  10/27/23 1220  Sign when Signing Visit  Chief Complaint   Patient presents with   • Peripheral Neuropathy       Patient ID: Jefferson Martínez is a 52 y.o. male.    HPI:  I had the pleasure of seeing your patient today.  As you may know he is a 52-year-old gentleman being seen at the request of and referred to us by Dr. Forman for history of numbness and tingling in both feet.  The patient says that he started having these symptoms in the toes and balls of both feet about 5 years ago.  It was symmetric and did not cause significant distress for him.  However the symptoms have progressed since then.  He does occasionally get some lower back pain however not on a fairly regular basis at this point in his life.  He did see a podiatrist for his issues and had an EMG/nerve conduction study about 4 years ago.  He says that the results of that test indicated \"tarsal tunnel syndrome\".  This apparently was noted bilaterally.  He did also have an MRI of the lumbosacral spine showing degenerative disc disease, mild for the most part however there was evidence for a disc protrusion at L5-S1 contacting the left L5 nerve root.  He essentially is here for second opinion on that to see if things have changed since then.  He apparently was slated for tarsal tunnel surgery bilaterally.  He has no bowel or bladder symptoms.  No chronic neck pain issues.  No symptoms in his hands.  In looking in his chart he did have a vitamin B12 level back 5 years ago " of 462.  More recently 3 years ago it was 671.    The following portions of the patient's history were reviewed and updated as appropriate: allergies, current medications, past family history, past medical history, past social history, past surgical history and problem list.    Review of Systems   Musculoskeletal:  Negative for gait problem.   Neurological:  Positive for numbness (both feet). Negative for dizziness, tremors, seizures, syncope, facial asymmetry, speech difficulty, weakness, light-headedness and headaches.   Psychiatric/Behavioral:  Positive for sleep disturbance (due to feet).       I have reviewed the review of systems above performed by my medical assistant.      Vitals:    10/27/23 1102   BP: 132/76   Pulse: 72   SpO2: 98%       Neurologic Exam     Mental Status   Oriented to person, place, and time.   Registration: recalls 3 of 3 objects. Follows 3 step commands.   Attention: normal. Concentration: normal.   Speech: speech is normal   Level of consciousness: alert  Knowledge: consistent with education (No deficits found.).   Normal comprehension.     Cranial Nerves     CN II   Visual fields full to confrontation.     CN III, IV, VI   Pupils are equal, round, and reactive to light.  Extraocular motions are normal.   CN III: no CN III palsy  CN VI: no CN VI palsy  Nystagmus: none   Diplopia: none    CN V   Facial sensation intact.     CN VII   Facial expression full, symmetric.     CN VIII   CN VIII normal.     CN IX, X   CN IX normal.   CN X normal.     CN XI   CN XI normal.     CN XII   CN XII normal.     Motor Exam   Muscle bulk: normal  Right arm tone: normal  Left arm tone: normal  Right leg tone: normal  Left leg tone: normal    Strength   Right neck flexion: 5/5  Left neck flexion: 5/5  Right neck extension: 5/5  Left neck extension: 5/5  Right deltoid: 5/5  Left deltoid: 5/5  Right biceps: 5/5  Left biceps: 5/5  Right triceps: 5/5  Left triceps: 5/5  Right wrist flexion: 5/5  Left wrist  flexion: 5/5  Right wrist extension: 5/5  Left wrist extension: 5/5  Right interossei: 5/5  Left interossei: 5/5  Right abdominals: 5/5  Left abdominals: 5/5  Right iliopsoas: 5/5  Left iliopsoas: 5/5  Right quadriceps: 5/5  Left quadriceps: 5/5  Right hamstrin/5  Left hamstrin/5  Right glutei: 5/5  Left glutei: 5/5  Right anterior tibial: 5/5  Left anterior tibial: 5/5  Right posterior tibial: 5/5  Left posterior tibial: 5/5  Right peroneal: 5/5  Left peroneal: 5/5  Right gastroc: 5/5  Left gastroc: 5/5    Sensory Exam   Light touch normal.   Right leg vibration: decreased from ankle  Proprioception normal.   Pinprick normal.     Gait, Coordination, and Reflexes     Gait  Gait: normal    Coordination   Romberg: negative    Tremor   Resting tremor: absent  Intention tremor: absent    Reflexes   Right brachioradialis: 2+  Left brachioradialis: 2+  Right biceps: 2+  Left biceps: 2+  Right triceps: 2+  Left triceps: 2+  Right patellar: 0  Left patellar: 0  Right achilles: 0  Left achilles: 0  Right : 2+  Left : 2+Station is normal.       Physical Exam  Vitals reviewed.   Constitutional:       General: He is not in acute distress.     Appearance: He is well-developed.   HENT:      Head: Normocephalic and atraumatic.   Eyes:      Extraocular Movements: EOM normal.      Pupils: Pupils are equal, round, and reactive to light.   Cardiovascular:      Rate and Rhythm: Normal rate and regular rhythm.      Heart sounds: Normal heart sounds.   Pulmonary:      Effort: Pulmonary effort is normal. No respiratory distress.      Breath sounds: Normal breath sounds.   Abdominal:      General: Bowel sounds are normal. There is no distension.      Palpations: Abdomen is soft.      Tenderness: There is no abdominal tenderness.   Musculoskeletal:         General: No deformity. Normal range of motion.      Cervical back: Normal range of motion.   Skin:     General: Skin is warm.      Findings: No rash.   Neurological:       Mental Status: He is oriented to person, place, and time.      Coordination: Romberg Test normal.      Gait: Gait is intact.      Deep Tendon Reflexes:      Reflex Scores:       Tricep reflexes are 2+ on the right side and 2+ on the left side.       Bicep reflexes are 2+ on the right side and 2+ on the left side.       Brachioradialis reflexes are 2+ on the right side and 2+ on the left side.       Patellar reflexes are 0 on the right side and 0 on the left side.       Achilles reflexes are 0 on the right side and 0 on the left side.  Psychiatric:         Speech: Speech normal.         Judgment: Judgment normal.         Procedures    Assessment/Plan: It sounds as though he likely has some type of radiculopathy based on MRI imaging from a couple of years ago and my exam.  However we will schedule an EMG/nerve conduction study of both lower extremities to further corroborate that.  I do also question whether he has a more diffuse peripheral neuropathy perhaps secondary to vitamin B12 deficiency whether it be large or small fiber.  May consider further lab testing in the future.  However we will start with EMG and go from there.  He will call for results of testing.       Diagnoses and all orders for this visit:    1. Numbness and tingling of both feet (Primary)  -     EMG & Nerve Conduction Test; Future           Jabier Hooker II, MD

## 2023-10-30 ENCOUNTER — PATIENT ROUNDING (BHMG ONLY) (OUTPATIENT)
Dept: NEUROLOGY | Facility: CLINIC | Age: 53
End: 2023-10-30
Payer: COMMERCIAL

## 2024-02-14 ENCOUNTER — HOSPITAL ENCOUNTER (OUTPATIENT)
Dept: INFUSION THERAPY | Facility: HOSPITAL | Age: 54
Discharge: HOME | End: 2024-02-14
Admitting: PSYCHIATRY & NEUROLOGY
Payer: COMMERCIAL

## 2024-02-14 DIAGNOSIS — R20.2 NUMBNESS AND TINGLING OF BOTH FEET: ICD-10-CM

## 2024-02-14 DIAGNOSIS — R20.0 NUMBNESS AND TINGLING OF BOTH FEET: ICD-10-CM

## 2024-02-14 PROCEDURE — 95909 NRV CNDJ TST 5-6 STUDIES: CPT

## 2024-02-14 PROCEDURE — 95886 MUSC TEST DONE W/N TEST COMP: CPT

## 2024-03-12 ENCOUNTER — TELEPHONE (OUTPATIENT)
Dept: NEUROLOGY | Facility: CLINIC | Age: 54
End: 2024-03-12
Payer: COMMERCIAL

## 2024-03-12 NOTE — TELEPHONE ENCOUNTER
PT SENT  3 MY CHART MESSAGE MARCH 2./ 6 / AND 11TH 2024 STILL NO RESPONSE       Caller: NINO     Relationship:     Best call back number: [phone]     What was the call regarding: PT HAD EMG AS INSTRUCTED BY DR VELEZ. HAS MADE 3 ATTEMPTS TO FIND OUT HIS NEXT STEP WITH NO RESPONSE ? YOUR SCHED IS OUT TO OCT FOR HUB?       Is it okay if the provider responds through MyChart: CALL     PLEASE ADVISE

## 2024-03-13 ENCOUNTER — TELEPHONE (OUTPATIENT)
Dept: NEUROLOGY | Facility: CLINIC | Age: 54
End: 2024-03-13

## 2024-03-13 ENCOUNTER — TELEPHONE (OUTPATIENT)
Dept: NEUROLOGY | Facility: CLINIC | Age: 54
End: 2024-03-13
Payer: COMMERCIAL

## 2024-03-13 NOTE — TELEPHONE ENCOUNTER
Tried to reach out to patient today.  No answer.  Left message with details about EMG results and to schedule follow-up for further care.

## 2024-03-13 NOTE — TELEPHONE ENCOUNTER
PATIENT CALLED FOR 1ST AVAILABLE - SCHEDULED FOR 10/18/24 AND WAIT LISTED.  ENCOUNTER SENT TO  TO LOOK FOR SOONER APPT.    PATIENT WANTS TO DISCUSS MEDICATION, ESPECIALLY IF THERE IS A 6 MONTH WAIT TO SEE PROVIDER.    ALSO, [facility] AND WOULD LIKE PROVIDER TO PUT A LETTER IN HIS MYCHART THAT GOES INTO DX AND CURRENT ISSUES SO THAT [facility] WILL BE AWARE.    PLEASE ADVISE PATIENT    THANK YOU

## 2024-03-13 NOTE — TELEPHONE ENCOUNTER
PATIENT CALLED TO SCHEDULE 1ST AVAILABLE PER DR VELEZ.    HUB SCHEDULED 1ST AVAILABLE 10/18/24 AND IS WAIT LISTED    PATIENT NEEDS TO BE SEEN SOONER PER DR VELEZ    PLEASE ADVISE PATIENT    THANK YOU

## 2024-03-18 DIAGNOSIS — M25.50 ARTHRALGIA, UNSPECIFIED JOINT: ICD-10-CM

## 2024-03-18 DIAGNOSIS — G62.9 NEUROPATHY: Primary | ICD-10-CM

## 2024-03-19 ENCOUNTER — TELEPHONE (OUTPATIENT)
Dept: FAMILY MEDICINE CLINIC | Facility: CLINIC | Age: 54
End: 2024-03-19

## 2024-03-19 NOTE — TELEPHONE ENCOUNTER
"        Hub staff attempted to follow warm transfer process and was unsuccessful     Caller: Jefferson Martínez \"Ortiz\"    Relationship to patient: Self    Best call back number:     Jefferson Martínez \"Ortiz\" (Self) [phone] (Mobile)       Patient is needing: LAB APPT AND ADDRESS FOR LABS         "

## 2024-03-26 LAB
ALBUMIN SERPL-MCNC: 4.4 G/DL (ref 3.5–5.2)
ALBUMIN/GLOB SERPL: 2.2 G/DL
ALP SERPL-CCNC: 81 U/L (ref 39–117)
ALT SERPL-CCNC: 29 U/L (ref 1–41)
ANA SER QL: NEGATIVE
AST SERPL-CCNC: 21 U/L (ref 1–40)
B BURGDOR IGG PATRN SER IB-IMP: NEGATIVE
B BURGDOR IGM PATRN SER IB-IMP: NEGATIVE
B BURGDOR18KD IGG SER QL IB: ABNORMAL
B BURGDOR23KD IGG SER QL IB: ABNORMAL
B BURGDOR23KD IGM SER QL IB: ABNORMAL
B BURGDOR28KD IGG SER QL IB: ABNORMAL
B BURGDOR30KD IGG SER QL IB: ABNORMAL
B BURGDOR39KD IGG SER QL IB: ABNORMAL
B BURGDOR39KD IGM SER QL IB: ABNORMAL
B BURGDOR41KD IGG SER QL IB: ABNORMAL
B BURGDOR41KD IGM SER QL IB: ABNORMAL
B BURGDOR45KD IGG SER QL IB: ABNORMAL
B BURGDOR58KD IGG SER QL IB: PRESENT
B BURGDOR66KD IGG SER QL IB: ABNORMAL
B BURGDOR93KD IGG SER QL IB: ABNORMAL
BASOPHILS # BLD AUTO: 0.04 10*3/MM3 (ref 0–0.2)
BASOPHILS NFR BLD AUTO: 0.5 % (ref 0–1.5)
BILIRUB SERPL-MCNC: 0.9 MG/DL (ref 0–1.2)
BUN SERPL-MCNC: 19 MG/DL (ref 6–20)
BUN/CREAT SERPL: 15.4 (ref 7–25)
CALCIUM SERPL-MCNC: 9.5 MG/DL (ref 8.6–10.5)
CHLORIDE SERPL-SCNC: 106 MMOL/L (ref 98–107)
CO2 SERPL-SCNC: 27 MMOL/L (ref 22–29)
CREAT SERPL-MCNC: 1.23 MG/DL (ref 0.76–1.27)
EGFRCR SERPLBLD CKD-EPI 2021: 69.8 ML/MIN/1.73
EOSINOPHIL # BLD AUTO: 0.17 10*3/MM3 (ref 0–0.4)
EOSINOPHIL NFR BLD AUTO: 2.2 % (ref 0.3–6.2)
ERYTHROCYTE [DISTWIDTH] IN BLOOD BY AUTOMATED COUNT: 13 % (ref 12.3–15.4)
FOLATE SERPL-MCNC: 18.5 NG/ML (ref 4.78–24.2)
GLOBULIN SER CALC-MCNC: 2 GM/DL
GLUCOSE SERPL-MCNC: 110 MG/DL (ref 65–99)
HCT VFR BLD AUTO: 45.8 % (ref 37.5–51)
HGB BLD-MCNC: 15.5 G/DL (ref 13–17.7)
IMM GRANULOCYTES # BLD AUTO: 0.01 10*3/MM3 (ref 0–0.05)
IMM GRANULOCYTES NFR BLD AUTO: 0.1 % (ref 0–0.5)
LYMPHOCYTES # BLD AUTO: 2.07 10*3/MM3 (ref 0.7–3.1)
LYMPHOCYTES NFR BLD AUTO: 27.3 % (ref 19.6–45.3)
MCH RBC QN AUTO: 29.4 PG (ref 26.6–33)
MCHC RBC AUTO-ENTMCNC: 33.8 G/DL (ref 31.5–35.7)
MCV RBC AUTO: 86.9 FL (ref 79–97)
MONOCYTES # BLD AUTO: 0.69 10*3/MM3 (ref 0.1–0.9)
MONOCYTES NFR BLD AUTO: 9.1 % (ref 5–12)
NEUTROPHILS # BLD AUTO: 4.6 10*3/MM3 (ref 1.7–7)
NEUTROPHILS NFR BLD AUTO: 60.8 % (ref 42.7–76)
NRBC BLD AUTO-RTO: 0 /100 WBC (ref 0–0.2)
PLATELET # BLD AUTO: 169 10*3/MM3 (ref 140–450)
POTASSIUM SERPL-SCNC: 4.9 MMOL/L (ref 3.5–5.2)
PROT SERPL-MCNC: 6.4 G/DL (ref 6–8.5)
RBC # BLD AUTO: 5.27 10*6/MM3 (ref 4.14–5.8)
RHEUMATOID FACT SERPL-ACNC: <10 IU/ML
SODIUM SERPL-SCNC: 140 MMOL/L (ref 136–145)
T4 FREE SERPL-MCNC: 1.32 NG/DL (ref 0.93–1.7)
TSH SERPL DL<=0.005 MIU/L-ACNC: 1.46 UIU/ML (ref 0.27–4.2)
URATE SERPL-MCNC: 6.9 MG/DL (ref 3.4–7)
VIT B12 SERPL-MCNC: 569 PG/ML (ref 211–946)
WBC # BLD AUTO: 7.58 10*3/MM3 (ref 3.4–10.8)

## 2024-03-28 ENCOUNTER — TELEPHONE (OUTPATIENT)
Dept: NEUROLOGY | Facility: CLINIC | Age: 54
End: 2024-03-28

## 2024-03-28 NOTE — TELEPHONE ENCOUNTER
"  Caller: Jefferson Martínez \"Ortiz\"    Relationship: Self    Best call back number: [phone]     What is the best time to reach you: ANY    Who are you requesting to speak with (clinical staff, provider,  specific staff member): ROSIE/PROVIDER    What was the call regarding: PATIENT TELEPHONED RE: SOONER APPT FOR EMG F/U OR HE ALSO WOULD LIKE A NOTE FROM PROVIDER RE: RESULTS. HE SENT A LENGTHY MY CHART STATEMENT RE: THIS ISSUE AS [facility] NOT [facility] WITH [facility]     PLEASE REVIEW &/OR WRITE LETTER OR CALL TO ADVISE-THANK YOU  "

## 2024-04-09 DIAGNOSIS — G62.9 NEUROPATHY: Primary | ICD-10-CM

## 2024-08-28 ENCOUNTER — OFFICE VISIT (OUTPATIENT)
Dept: NEUROLOGY | Facility: CLINIC | Age: 54
End: 2024-08-28
Payer: COMMERCIAL

## 2024-08-28 VITALS
SYSTOLIC BLOOD PRESSURE: 132 MMHG | WEIGHT: 292 LBS | OXYGEN SATURATION: 95 % | HEIGHT: 74 IN | BODY MASS INDEX: 37.47 KG/M2 | HEART RATE: 86 BPM | DIASTOLIC BLOOD PRESSURE: 86 MMHG

## 2024-08-28 DIAGNOSIS — G60.9 PERIPHERAL NEUROPATHY, IDIOPATHIC: Primary | ICD-10-CM

## 2024-08-28 PROCEDURE — 99214 OFFICE O/P EST MOD 30 MIN: CPT | Performed by: PSYCHIATRY & NEUROLOGY

## 2024-08-28 NOTE — PROGRESS NOTES
Chief Complaint   Patient presents with    Numbness and tingling of both feet       Patient ID: Jefferson Martínez is a 53 y.o. male.    HPI:  I have had the pleasure of seeing your patient again today.  As you may know he is a 53-year-old gentleman here for the management of neuropathy.  He did have the EMG/nerve conduction study performed here in our office.  This did show a severe polyneuropathy with very low amplitude or absent responses in the motor conductions in both legs.  We have done peripheral neuropathy labs that did not show any significant abnormalities.  He has had some low vitamin B12 labs in the past (4 years ago).  He has also had some elevated fasting blood glucose levels per labs.  He continues to have significant pain in both feet.  He is a  therefore he is not wanting to take medication for this issue.  He denies any drastic spread.  He states that his symptoms are quite severe at night and do prevent sleep on occasion.  No symptoms in his hands.  No trouble swallowing.  No double vision.    The following portions of the patient's history were reviewed and updated as appropriate: allergies, current medications, past family history, past medical history, past social history, past surgical history and problem list.    Review of Systems   Constitutional:  Positive for fatigue.   Neurological:  Positive for numbness. Negative for dizziness, tremors, seizures, syncope, facial asymmetry, speech difficulty, weakness, light-headedness and headaches.   Psychiatric/Behavioral:  Positive for sleep disturbance. Negative for agitation, behavioral problems, confusion, decreased concentration, dysphoric mood, hallucinations, self-injury and suicidal ideas. The patient is not nervous/anxious and is not hyperactive.       I have reviewed the review of systems above performed by my medical assistant.      Vitals:    08/28/24 1158   BP: 132/86   Pulse: 86   SpO2: 95%       Neurologic Exam     Mental Status    Oriented to person, place, and time.   Concentration: normal.   Level of consciousness: alert  Knowledge: consistent with education (No deficits found.).     Cranial Nerves     CN II   Visual fields full to confrontation.     CN III, IV, VI   Pupils are equal, round, and reactive to light.  Extraocular motions are normal.   CN III: no CN III palsy  CN VI: no CN VI palsy    CN V   Facial sensation intact.     CN VII   Facial expression full, symmetric.     CN VIII   CN VIII normal.     CN IX, X   CN IX normal.   CN X normal.     CN XI   CN XI normal.     CN XII   CN XII normal.     Motor Exam     Strength   Right neck flexion: 5/5  Left neck flexion: 5/5  Right neck extension: 5/5  Left neck extension: 5/5  Right deltoid: 5/5  Left deltoid: 5/5  Right biceps: 5/5  Left biceps: 5/5  Right triceps: 5/5  Left triceps: 5/5  Right wrist flexion: 5/5  Left wrist flexion: 5/5  Right wrist extension: 5/5  Left wrist extension: 5/5  Right interossei: 5/5  Left interossei: 5/5  Right abdominals: 5/5  Left abdominals: 5/5  Right iliopsoas: 5/5  Left iliopsoas: 5/5  Right quadriceps: 5/5  Left quadriceps: 5/5  Right hamstrin/5  Left hamstrin/5  Right glutei: 5/5  Left glutei: 5/5  Right anterior tibial: 5/5  Left anterior tibial: 5/5  Right posterior tibial: 5/5  Left posterior tibial: 5/5  Right peroneal: 5/5  Left peroneal: 5/5  Right gastroc: 5/5  Left gastroc: 5/5    Sensory Exam   Light touch normal.   Vibration normal.     Gait, Coordination, and Reflexes     Gait  Gait: normal    Reflexes   Right brachioradialis: 2+  Left brachioradialis: 2+  Right biceps: 2+  Left biceps: 2+  Right triceps: 2+  Left triceps: 2+  Right patellar: 2+  Left patellar: 2+  Right achilles: 2+  Left achilles: 2+  Right : 2+  Left : 2+Station is normal.       Physical Exam  Vitals reviewed.   Constitutional:       Appearance: He is well-developed.   HENT:      Head: Normocephalic and atraumatic.   Eyes:      Extraocular  Movements: EOM normal.      Pupils: Pupils are equal, round, and reactive to light.   Cardiovascular:      Rate and Rhythm: Normal rate and regular rhythm.   Pulmonary:      Breath sounds: Normal breath sounds.   Musculoskeletal:         General: Normal range of motion.   Skin:     General: Skin is warm.   Neurological:      Mental Status: He is oriented to person, place, and time.      Gait: Gait is intact.      Deep Tendon Reflexes:      Reflex Scores:       Tricep reflexes are 2+ on the right side and 2+ on the left side.       Bicep reflexes are 2+ on the right side and 2+ on the left side.       Brachioradialis reflexes are 2+ on the right side and 2+ on the left side.       Patellar reflexes are 2+ on the right side and 2+ on the left side.       Achilles reflexes are 2+ on the right side and 2+ on the left side.        Procedures    Assessment/Plan: The plan is to continue follow-up and follow his symptoms over the long-term.  We have again discussed neuropathic pain modulating medication.  He is still not wanting to pursue that at this time.  Will see him back in 6 months or sooner if needed.  A total of 30 minutes was spent face-to-face with the patient today.  Of that greater than 50% of this time was spent discussing signs and symptoms of peripheral neuropathy, patient education, plan of care and prognosis.         Diagnoses and all orders for this visit:    1. Peripheral neuropathy, idiopathic (Primary)           Jabier Hooker II, MD

## 2025-02-19 ENCOUNTER — OFFICE VISIT (OUTPATIENT)
Dept: FAMILY MEDICINE CLINIC | Facility: CLINIC | Age: 55
End: 2025-02-19
Payer: COMMERCIAL

## 2025-02-19 VITALS
TEMPERATURE: 98.5 F | DIASTOLIC BLOOD PRESSURE: 86 MMHG | HEIGHT: 74 IN | HEART RATE: 90 BPM | SYSTOLIC BLOOD PRESSURE: 132 MMHG | BODY MASS INDEX: 36.83 KG/M2 | OXYGEN SATURATION: 98 % | WEIGHT: 287 LBS | RESPIRATION RATE: 18 BRPM

## 2025-02-19 DIAGNOSIS — K20.90 ESOPHAGITIS: ICD-10-CM

## 2025-02-19 DIAGNOSIS — K20.90 ESOPHAGITIS: Primary | ICD-10-CM

## 2025-02-19 DIAGNOSIS — Z12.11 SCREENING FOR COLON CANCER: ICD-10-CM

## 2025-02-19 PROCEDURE — 99214 OFFICE O/P EST MOD 30 MIN: CPT | Performed by: FAMILY MEDICINE

## 2025-02-19 RX ORDER — PANTOPRAZOLE SODIUM 40 MG/1
40 TABLET, DELAYED RELEASE ORAL DAILY
Qty: 30 TABLET | Refills: 1 | Status: SHIPPED | OUTPATIENT
Start: 2025-02-19

## 2025-02-19 RX ORDER — SUCRALFATE 1 G/1
1 TABLET ORAL 4 TIMES DAILY
Qty: 40 TABLET | Refills: 1 | Status: SHIPPED | OUTPATIENT
Start: 2025-02-19

## 2025-02-19 RX ORDER — SUCRALFATE 1 G/1
1 TABLET ORAL 4 TIMES DAILY
Qty: 40 TABLET | Refills: 1 | Status: SHIPPED | OUTPATIENT
Start: 2025-02-19 | End: 2025-02-19 | Stop reason: SDUPTHER

## 2025-02-19 RX ORDER — IBUPROFEN 200 MG
200 TABLET ORAL EVERY 6 HOURS PRN
COMMUNITY

## 2025-02-19 NOTE — PROGRESS NOTES
"Subjective   Jefferson Martínez is a 54 y.o. male.     CC: GI Issues    History of Present Illness     Patient comes in today after sending this message on 2/14/2025:    Dr Forman, I need to get checked out. Been dealing with a dull ache in my upper right chest. Swallowing certain food and drink aggravates it. Not associated with exercise so I don’t think it’s a circulation issue.     Has somewhat of a cough along with these sx. Pt reports feels it more with certain foods when he swallows. Pt has no reported true dysphagia. All sx started about 2 months ago. Pt exercises well and reports these sx are not affected with exercise.   Using some TUMS w/o much help. Pt has a h/o neuropathy of the feet and uses Advil for many years.      The following portions of the patient's history were reviewed and updated as appropriate: allergies, current medications, past family history, past medical history, past social history, past surgical history, and problem list.    Review of Systems   Constitutional:  Negative for activity change, chills, diaphoresis, fatigue and fever.   HENT:  Negative for congestion and sore throat.    Respiratory:  Negative for cough.    Cardiovascular:  Negative for chest pain.   Gastrointestinal:  Negative for abdominal distention, abdominal pain, blood in stool, nausea and vomiting.   Genitourinary:  Negative for dysuria.   Musculoskeletal:  Negative for myalgias and neck pain.   Skin:  Negative for rash.   Neurological:  Negative for weakness, numbness and headaches.   Psychiatric/Behavioral:  Negative for dysphoric mood.      /86   Pulse 90   Temp 98.5 °F (36.9 °C) (Oral)   Resp 18   Ht 188 cm (74.02\")   Wt 130 kg (287 lb)   SpO2 98%   BMI 36.83 kg/m²     Objective   Physical Exam  Vitals and nursing note reviewed.   Constitutional:       General: He is not in acute distress.     Appearance: He is well-developed.   Pulmonary:      Effort: Pulmonary effort is normal.   Abdominal:      " General: Abdomen is flat. There is no distension.      Palpations: Abdomen is soft.      Tenderness: There is no abdominal tenderness.      Hernia: No hernia is present.   Neurological:      Mental Status: He is alert and oriented to person, place, and time.   Psychiatric:         Behavior: Behavior normal.         Thought Content: Thought content normal.         Assessment & Plan   Diagnoses and all orders for this visit:    1. Esophagitis (Primary)  -     pantoprazole (PROTONIX) 40 MG EC tablet; Take 1 tablet by mouth Daily.  Dispense: 30 tablet; Refill: 1  -     sucralfate (Carafate) 1 g tablet; Take 1 tablet by mouth 4 (Four) Times a Day.  Dispense: 40 tablet; Refill: 1  -     Ambulatory Referral to General Surgery    2. Screening for colon cancer  -     Ambulatory Referral to General Surgery    Elevation of head of bed discussed.  Since gastro is so booked out for so long, will get patient over to general surgery for consideration of EGD and he is due a colonoscopy, which hopefully they can do at the same time.

## 2025-02-19 NOTE — TELEPHONE ENCOUNTER
"Caller: Jefferson Martínez \"Ortiz\"    Relationship: Self    Best call back number: [phone]     Requested Prescriptions:   Requested Prescriptions     Pending Prescriptions Disp Refills    sucralfate (Carafate) 1 g tablet 40 tablet 1     Sig: Take 1 tablet by mouth 4 (Four) Times a Day.        Pharmacy where request should be sent: [id number] [place], [place] - [address] AT [facility] [phone] [facility] [phone]      Last office visit with prescribing clinician: 2/19/2025   Last telemedicine visit with prescribing clinician: Visit date not found   Next office visit with prescribing clinician: Visit date not found     Additional details provided by patient: PATIENT CALLED STATED THE PHARMACY DID NOT RECEIVE THE PRESCRIPTION AND PLEASE RESEND.      Does the patient have less than a 3 day supply:  [] Yes  [] No    Would you like a call back once the refill request has been completed: [] Yes [] No    If the office needs to give you a call back, can they leave a voicemail: [] Yes [] No    Bailee Zurita Rep   02/19/25 14:59 EST              "

## 2025-03-17 ENCOUNTER — OFFICE VISIT (OUTPATIENT)
Dept: SURGERY | Facility: CLINIC | Age: 55
End: 2025-03-17
Payer: OTHER GOVERNMENT

## 2025-03-17 VITALS
WEIGHT: 292 LBS | HEART RATE: 79 BPM | HEIGHT: 74 IN | OXYGEN SATURATION: 97 % | SYSTOLIC BLOOD PRESSURE: 140 MMHG | BODY MASS INDEX: 37.47 KG/M2 | DIASTOLIC BLOOD PRESSURE: 80 MMHG

## 2025-03-17 DIAGNOSIS — R13.10 ODYNOPHAGIA: Primary | ICD-10-CM

## 2025-03-17 DIAGNOSIS — Z12.11 ENCOUNTER FOR SCREENING COLONOSCOPY: ICD-10-CM

## 2025-03-17 PROCEDURE — 99203 OFFICE O/P NEW LOW 30 MIN: CPT | Performed by: SURGERY

## 2025-03-17 RX ORDER — ACETAMINOPHEN 500 MG
500 TABLET ORAL EVERY 6 HOURS PRN
COMMUNITY

## 2025-03-17 NOTE — PROGRESS NOTES
Cc: Odynophagia, encounter for screening colonoscopy    History of presenting illness:   This is a quite nice, generally healthy 54-year-old gentleman presenting with several months of worsening pain in his chest with eating.  He states that certain foods will hurt as they passed through the esophagus.  There is no real dysphagia and there is no regurgitation of foods.  He started taking Carafate and Protonix and this has seemed to improve to some extent but there is still some discomfort.  He has a history of taking ibuprofen for a long period of time due to problems with his feet.  He has essentially stopped taking the ibuprofen currently.  The pain is in his chest, to the right of midline.  It does not radiate to his arms.    Past Medical History: Peripheral neuropathy    Past Surgical History: Significant for laparoscopic cholecystectomy in 2004    Medications: Previously took a lot of ibuprofen, now not taking that.  He is taking Tylenol about once a day.  Also currently on Carafate 1 g 4 times a day and Protonix 40 mg daily    Allergies: None known    Social History: He is a non-smoker.  He is a .    Family History: Negative for colon or rectal cancer    Review of Systems:  Constitutional: Denies fever, chills, change in weight  Neck: no swollen glands or dysphagia, positive for odynophagia  Respiratory: negative for SOB, cough, hemoptysis or wheezing  Cardiovascular: negative for chest pain, palpitations or peripheral edema  Gastrointestinal: Soft and benign without mass or hernia      Physical Exam:  BMI: 37.5  General: alert and oriented, appropriate, no acute distress  Eyes: No scleral icterus, extraocular movements are intact  Neck: Supple without lymphadenopathy or thyromegaly, trachea is in the midline  Respiratory: There is good bilateral chest expansion, no use of accessory muscles is noted  Cardiovascular: No jugular venous distention or peripheral edema is seen  Gastrointestinal: Soft and  benign, no mass, no hernia    Laboratory data: No recent relevant laboratory data.  He had a negative Cologuard assay in 2020.    Imaging data: Unremarkable chest x-ray from October 2024      Assessment and plan:   -Odynophagia  -Encounter for screening colonoscopy  -Options discussed with patient.  I have recommended proceeding with a EGD and colonoscopy.  Risks include bleeding and perforation.  Patient is agreeable to proceed.  -May be worthwhile considering a CT of the chest if endoscopy is unremarkable.      Cayetano Hernandez MD  General, Minimally Invasive and Endoscopic Surgery  [facility]    [address], [address]  [place], [place], [address]  P: [phone]  F: [phone]

## 2025-03-17 NOTE — H&P (VIEW-ONLY)
Cc: Odynophagia, encounter for screening colonoscopy    History of presenting illness:   This is a quite nice, generally healthy 54-year-old gentleman presenting with several months of worsening pain in his chest with eating.  He states that certain foods will hurt as they passed through the esophagus.  There is no real dysphagia and there is no regurgitation of foods.  He started taking Carafate and Protonix and this has seemed to improve to some extent but there is still some discomfort.  He has a history of taking ibuprofen for a long period of time due to problems with his feet.  He has essentially stopped taking the ibuprofen currently.  The pain is in his chest, to the right of midline.  It does not radiate to his arms.    Past Medical History: Peripheral neuropathy    Past Surgical History: Significant for laparoscopic cholecystectomy in 2004    Medications: Previously took a lot of ibuprofen, now not taking that.  He is taking Tylenol about once a day.  Also currently on Carafate 1 g 4 times a day and Protonix 40 mg daily    Allergies: None known    Social History: He is a non-smoker.  He is a .    Family History: Negative for colon or rectal cancer    Review of Systems:  Constitutional: Denies fever, chills, change in weight  Neck: no swollen glands or dysphagia, positive for odynophagia  Respiratory: negative for SOB, cough, hemoptysis or wheezing  Cardiovascular: negative for chest pain, palpitations or peripheral edema  Gastrointestinal: Soft and benign without mass or hernia      Physical Exam:  BMI: 37.5  General: alert and oriented, appropriate, no acute distress  Eyes: No scleral icterus, extraocular movements are intact  Neck: Supple without lymphadenopathy or thyromegaly, trachea is in the midline  Respiratory: There is good bilateral chest expansion, no use of accessory muscles is noted  Cardiovascular: No jugular venous distention or peripheral edema is seen  Gastrointestinal: Soft and  benign, no mass, no hernia    Laboratory data: No recent relevant laboratory data.  He had a negative Cologuard assay in 2020.    Imaging data: Unremarkable chest x-ray from October 2024      Assessment and plan:   -Odynophagia  -Encounter for screening colonoscopy  -Options discussed with patient.  I have recommended proceeding with a EGD and colonoscopy.  Risks include bleeding and perforation.  Patient is agreeable to proceed.  -May be worthwhile considering a CT of the chest if endoscopy is unremarkable.      Cayetano Hernandez MD  General, Minimally Invasive and Endoscopic Surgery  [facility]    [address], [address]  [place], [place], [address]  P: [phone]  F: [phone]

## 2025-04-08 ENCOUNTER — ANESTHESIA EVENT (OUTPATIENT)
Dept: GASTROENTEROLOGY | Facility: HOSPITAL | Age: 55
End: 2025-04-08
Payer: COMMERCIAL

## 2025-04-08 ENCOUNTER — ANESTHESIA (OUTPATIENT)
Dept: GASTROENTEROLOGY | Facility: HOSPITAL | Age: 55
End: 2025-04-08
Payer: COMMERCIAL

## 2025-04-08 ENCOUNTER — HOSPITAL ENCOUNTER (OUTPATIENT)
Facility: HOSPITAL | Age: 55
Setting detail: HOSPITAL OUTPATIENT SURGERY
Discharge: HOME | End: 2025-04-08
Attending: SURGERY | Admitting: SURGERY
Payer: COMMERCIAL

## 2025-04-08 VITALS
HEART RATE: 93 BPM | DIASTOLIC BLOOD PRESSURE: 79 MMHG | RESPIRATION RATE: 10 BRPM | BODY MASS INDEX: 35.07 KG/M2 | WEIGHT: 288 LBS | OXYGEN SATURATION: 95 % | SYSTOLIC BLOOD PRESSURE: 118 MMHG | HEIGHT: 76 IN

## 2025-04-08 DIAGNOSIS — R13.10 ODYNOPHAGIA: ICD-10-CM

## 2025-04-08 DIAGNOSIS — Z12.11 ENCOUNTER FOR SCREENING COLONOSCOPY: ICD-10-CM

## 2025-04-08 PROCEDURE — 25010000002 LIDOCAINE 2% SOLUTION

## 2025-04-08 PROCEDURE — 25010000002 PROPOFOL 10 MG/ML EMULSION

## 2025-04-08 PROCEDURE — 25810000003 LACTATED RINGERS PER 1000 ML: Performed by: SURGERY

## 2025-04-08 PROCEDURE — 43235 EGD DIAGNOSTIC BRUSH WASH: CPT | Performed by: SURGERY

## 2025-04-08 PROCEDURE — 88305 TISSUE EXAM BY PATHOLOGIST: CPT | Performed by: SURGERY

## 2025-04-08 PROCEDURE — 45380 COLONOSCOPY AND BIOPSY: CPT | Performed by: SURGERY

## 2025-04-08 RX ORDER — SODIUM CHLORIDE, SODIUM LACTATE, POTASSIUM CHLORIDE, CALCIUM CHLORIDE 600; 310; 30; 20 MG/100ML; MG/100ML; MG/100ML; MG/100ML
30 INJECTION, SOLUTION INTRAVENOUS CONTINUOUS
Status: DISCONTINUED | OUTPATIENT
Start: 2025-04-08 | End: 2025-04-08 | Stop reason: HOSPADM

## 2025-04-08 RX ORDER — DIPHENHYDRAMINE HYDROCHLORIDE 50 MG/ML
12.5 INJECTION, SOLUTION INTRAMUSCULAR; INTRAVENOUS
Status: DISCONTINUED | OUTPATIENT
Start: 2025-04-08 | End: 2025-04-08 | Stop reason: HOSPADM

## 2025-04-08 RX ORDER — ONDANSETRON 2 MG/ML
4 INJECTION INTRAMUSCULAR; INTRAVENOUS ONCE AS NEEDED
Status: DISCONTINUED | OUTPATIENT
Start: 2025-04-08 | End: 2025-04-08 | Stop reason: HOSPADM

## 2025-04-08 RX ORDER — PROPOFOL 10 MG/ML
VIAL (ML) INTRAVENOUS AS NEEDED
Status: DISCONTINUED | OUTPATIENT
Start: 2025-04-08 | End: 2025-04-08 | Stop reason: SURG

## 2025-04-08 RX ORDER — FLUMAZENIL 0.1 MG/ML
0.2 INJECTION INTRAVENOUS AS NEEDED
Status: DISCONTINUED | OUTPATIENT
Start: 2025-04-08 | End: 2025-04-08 | Stop reason: HOSPADM

## 2025-04-08 RX ORDER — PROMETHAZINE HYDROCHLORIDE 25 MG/1
25 SUPPOSITORY RECTAL ONCE AS NEEDED
Status: DISCONTINUED | OUTPATIENT
Start: 2025-04-08 | End: 2025-04-08 | Stop reason: HOSPADM

## 2025-04-08 RX ORDER — DROPERIDOL 2.5 MG/ML
0.62 INJECTION, SOLUTION INTRAMUSCULAR; INTRAVENOUS
Status: DISCONTINUED | OUTPATIENT
Start: 2025-04-08 | End: 2025-04-08 | Stop reason: HOSPADM

## 2025-04-08 RX ORDER — PROMETHAZINE HYDROCHLORIDE 25 MG/1
25 TABLET ORAL ONCE AS NEEDED
Status: DISCONTINUED | OUTPATIENT
Start: 2025-04-08 | End: 2025-04-08 | Stop reason: HOSPADM

## 2025-04-08 RX ORDER — NALOXONE HCL 0.4 MG/ML
0.2 VIAL (ML) INJECTION AS NEEDED
Status: DISCONTINUED | OUTPATIENT
Start: 2025-04-08 | End: 2025-04-08 | Stop reason: HOSPADM

## 2025-04-08 RX ORDER — LIDOCAINE HYDROCHLORIDE 20 MG/ML
INJECTION, SOLUTION INFILTRATION; PERINEURAL AS NEEDED
Status: DISCONTINUED | OUTPATIENT
Start: 2025-04-08 | End: 2025-04-08 | Stop reason: SURG

## 2025-04-08 RX ADMIN — PROPOFOL 180 MCG/KG/MIN: 10 INJECTION, EMULSION INTRAVENOUS at 13:34

## 2025-04-08 RX ADMIN — PROPOFOL 100 MG: 10 INJECTION, EMULSION INTRAVENOUS at 13:34

## 2025-04-08 RX ADMIN — PROPOFOL 100 MG: 10 INJECTION, EMULSION INTRAVENOUS at 13:32

## 2025-04-08 RX ADMIN — SODIUM CHLORIDE, POTASSIUM CHLORIDE, SODIUM LACTATE AND CALCIUM CHLORIDE 30 ML/HR: 600; 310; 30; 20 INJECTION, SOLUTION INTRAVENOUS at 13:06

## 2025-04-08 RX ADMIN — LIDOCAINE HYDROCHLORIDE 60 MG: 20 INJECTION, SOLUTION INFILTRATION; PERINEURAL at 13:33

## 2025-04-08 NOTE — DISCHARGE INSTRUCTIONS
For the next 24 hours patient needs to be with a responsible adult.    For 24 hours DO NOT drive, operate machinery, appliances, drink alcohol, make important decisions or sign legal documents.    Start with a light or bland diet if you are feeling sick to your stomach otherwise advance to regular diet as tolerated.    Follow recommendations on procedure report if provided by your doctor.    Call Dr Hernandez for problems [phone]. Await pathology result in 7-10 days.    Problems may include but not limited to: large amounts of bleeding, trouble breathing, repeated vomiting, severe unrelieved pain, fever or chills.

## 2025-04-08 NOTE — OP NOTE
Operative Note :   MD Jefferson Sifuentes JULIO CESAR Mauricio  1970    Procedure Date: 04/08/25    Pre-op Diagnosis:  Odynophagia  Encounter for screening colonoscopy    Post-op Diagnosis:  Normal upper endoscopy  Colon polyp    Procedure:   Esophagogastroduodenoscopy  Colonoscopy to terminal ileum with cold forceps polypectomy x 2    Surgeon: Cayetano Hernandez MD      Anesthesia: MAC    Indications:  54-year-old gentleman presenting for initial screening colonoscopy.  He also describes some intermittent symptoms of odynophagia although this has been improving recently with Carafate.    Findings:   Normal upper endoscopy, specifically no esophagitis, no esophageal diverticulum or stricture  Benign-appearing colon polyps x 2    Recommendations:   Continue medical management  Follow-up polyp pathology for recommendation regarding repeat colonoscopy    Description of procedure:    After obtaining informed consent, patient was brought to the endoscopy suite and was sedated.  Flexible gastroscope was introduced through the patient's mouth, passed the cricopharyngeus into the esophagus, beyond the GE junction into the stomach and then passed the pylorus and into the duodenum.  The junction of the second and third portion was reached.  The duodenal mucosa was normal.  The major papilla and duodenal bulb were unremarkable as was the pyloric channel.  The scope was pulled back into the stomach was normal.  Retroflexion was unremarkable.  The GE junction was normal.  The esophagus was carefully examined on withdrawal of the scope and there were no evidences of ulceration, esophagitis, diverticulum or stricture.  Scope was withdrawn completely.  The patient was turned around    Digital rectal exam was undertaken and was unremarkable.  The flexible colonoscope was then introduced to the rectum and passed around to the level of the cecum under direct visualization.  The terminal ileum was intubated and the visualized ileal mucosa  was unremarkable.  The scope was pulled back into the cecum which was normal as was the remainder of the ascending colon, hepatic flexure.  In the transverse colon there was a single benign-appearing polyp removed with 2 bites of the polypectomy forceps.  The site was hemostatic.  The remainder of the transverse colon, splenic flexure, descending colon were normal.  In the distal sigmoid colon there was a single small benign polyp, sessile, likely hyperplastic and removed with a single bite of the polypectomy forceps.  The site was hemostatic.  The rectum was unremarkable.  The scope was withdrawn completely.    Cayetano Hernandez MD  General and Endoscopic Surgery  [facility]    [address], [address]  [place], [place], [address]  P: [phone]  F: [phone]

## 2025-04-08 NOTE — ANESTHESIA PREPROCEDURE EVALUATION
Anesthesia Evaluation     NPO Solid Status: > 8 hours             Airway   Mallampati: II  TM distance: >3 FB  Neck ROM: full  No difficulty expected  Dental - normal exam     Pulmonary    (-) wheezes  Cardiovascular     Rhythm: regular        Neuro/Psych  GI/Hepatic/Renal/Endo      Musculoskeletal     Abdominal    Substance History      OB/GYN          Other                      Anesthesia Plan    ASA 2     MAC     (D/W pt. MAC and possible awareness intra op.  Pt understands MAC and GA are not the same and the possibility of GA being required for failed MAC)  intravenous induction     Anesthetic plan, risks, benefits, and alternatives have been provided, discussed and informed consent has been obtained with: patient.    CODE STATUS:

## 2025-04-08 NOTE — ANESTHESIA POSTPROCEDURE EVALUATION
Patient: Jefferson Martínez    Procedure Summary       Date: 04/08/25 Room / Location:  [facility] /  [facility]    Anesthesia Start: 1328 Anesthesia Stop: 1358    Procedures:       COLONOSCOPY to cecum with cold biopsy polypectomies      ESOPHAGOGASTRODUODENOSCOPY (Esophagus) Diagnosis:       Odynophagia      Encounter for screening colonoscopy      (Odynophagia [R13.10])      (Encounter for screening colonoscopy [Z12.11])    Surgeons: Cayetano Hernandez MD Provider: Cassius Dash MD    Anesthesia Type: MAC ASA Status: 2            Anesthesia Type: MAC    Vitals  Vitals Value Taken Time   /79 04/08/25 14:18   Temp     Pulse 87 04/08/25 14:19   Resp 10 04/08/25 14:17   SpO2 95 % 04/08/25 14:19   Vitals shown include unfiled device data.        Post Anesthesia Care and Evaluation    Patient location during evaluation: bedside  Patient participation: complete - patient participated  Level of consciousness: awake  Pain management: adequate    Airway patency: patent  Anesthetic complications: No anesthetic complications  PONV Status: none  Cardiovascular status: acceptable  Respiratory status: acceptable  Hydration status: acceptable  Post Neuraxial Block status: Motor and sensory function returned to baseline

## 2025-04-08 NOTE — INTERVAL H&P NOTE
H&P updated. The patient was examined and the following changes are noted:  Overall he feels that the degree of a dyne aphasia has improved.  He now only experiences the symptoms intermittently.

## 2025-04-09 ENCOUNTER — RESULTS FOLLOW-UP (OUTPATIENT)
Dept: GASTROENTEROLOGY | Facility: HOSPITAL | Age: 55
End: 2025-04-09
Payer: COMMERCIAL

## 2025-04-09 LAB
CYTO UR: NORMAL
LAB AP CASE REPORT: NORMAL
PATH REPORT.FINAL DX SPEC: NORMAL
PATH REPORT.GROSS SPEC: NORMAL

## 2025-04-09 NOTE — PROGRESS NOTES
Please notify the patient his polyp was benign, recommend repeat colonoscopy in 5 years and placed in recall for the same.  Thank you.

## 2025-05-14 ENCOUNTER — APPOINTMENT (OUTPATIENT)
Dept: ULTRASOUND IMAGING | Facility: HOSPITAL | Age: 55
End: 2025-05-14
Payer: COMMERCIAL

## 2025-05-14 ENCOUNTER — HOSPITAL ENCOUNTER (EMERGENCY)
Facility: HOSPITAL | Age: 55
Discharge: HOME | End: 2025-05-15
Attending: EMERGENCY MEDICINE
Payer: COMMERCIAL

## 2025-05-14 DIAGNOSIS — T81.72XA: Primary | ICD-10-CM

## 2025-05-14 DIAGNOSIS — I80.01: Primary | ICD-10-CM

## 2025-05-14 LAB — D DIMER PPP FEU-MCNC: 1.19 MCGFEU/ML (ref 0–0.55)

## 2025-05-14 PROCEDURE — 85379 FIBRIN DEGRADATION QUANT: CPT | Performed by: EMERGENCY MEDICINE

## 2025-05-14 PROCEDURE — 36415 COLL VENOUS BLD VENIPUNCTURE: CPT

## 2025-05-14 PROCEDURE — 93971 EXTREMITY STUDY: CPT

## 2025-05-14 PROCEDURE — 99284 EMERGENCY DEPT VISIT MOD MDM: CPT

## 2025-05-14 PROCEDURE — 99283 EMERGENCY DEPT VISIT LOW MDM: CPT | Performed by: EMERGENCY MEDICINE

## 2025-05-15 VITALS
DIASTOLIC BLOOD PRESSURE: 94 MMHG | BODY MASS INDEX: 34.1 KG/M2 | HEART RATE: 72 BPM | SYSTOLIC BLOOD PRESSURE: 149 MMHG | WEIGHT: 280 LBS | TEMPERATURE: 98.2 F | OXYGEN SATURATION: 95 % | RESPIRATION RATE: 20 BRPM | HEIGHT: 76 IN

## 2025-05-15 RX ORDER — CEPHALEXIN 500 MG/1
500 CAPSULE ORAL 2 TIMES DAILY
Qty: 20 CAPSULE | Refills: 0 | Status: SHIPPED | OUTPATIENT
Start: 2025-05-15 | End: 2025-05-25

## 2025-05-15 RX ORDER — CEPHALEXIN 500 MG/1
500 CAPSULE ORAL ONCE
Status: COMPLETED | OUTPATIENT
Start: 2025-05-15 | End: 2025-05-15

## 2025-05-15 RX ADMIN — CEPHALEXIN 500 MG: 500 CAPSULE ORAL at 01:54

## 2025-05-15 NOTE — ED NOTES
Pt has hx of blood clots noticed leg feeling sore around May 4, went to [place] via plane around May 7.  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 Pt has swelling, redness and pain on right leg below the knee. Last dose of aspirin 7pm, heat also applied by pt.

## (undated) DEVICE — CANN O2 ETCO2 FITS ALL CONN CO2 SMPL A/ 7IN DISP LF

## (undated) DEVICE — SENSR O2 OXIMAX FNGR A/ 18IN NONSTR

## (undated) DEVICE — BLCK/BITE BLOX W/DENTL/RIM W/STRAP 54F

## (undated) DEVICE — LN SMPL CO2 SHTRM SD STREAM W/M LUER

## (undated) DEVICE — KT ORCA ORCAPOD DISP STRL

## (undated) DEVICE — TUBING, SUCTION, 1/4" X 10', STRAIGHT: Brand: MEDLINE

## (undated) DEVICE — SINGLE-USE BIOPSY FORCEPS: Brand: RADIAL JAW 4

## (undated) DEVICE — ADAPT CLN BIOGUARD AIR/H2O DISP